# Patient Record
Sex: FEMALE | Race: WHITE | URBAN - METROPOLITAN AREA
[De-identification: names, ages, dates, MRNs, and addresses within clinical notes are randomized per-mention and may not be internally consistent; named-entity substitution may affect disease eponyms.]

---

## 2017-03-17 ENCOUNTER — OUTPATIENT (OUTPATIENT)
Dept: OUTPATIENT SERVICES | Facility: HOSPITAL | Age: 72
LOS: 1 days | End: 2017-03-17

## 2024-05-02 ENCOUNTER — EMERGENCY (EMERGENCY)
Facility: HOSPITAL | Age: 79
LOS: 1 days | Discharge: HOME | End: 2024-05-02
Attending: EMERGENCY MEDICINE
Payer: MEDICARE

## 2024-05-02 VITALS
TEMPERATURE: 98 F | RESPIRATION RATE: 18 BRPM | HEART RATE: 72 BPM | SYSTOLIC BLOOD PRESSURE: 128 MMHG | OXYGEN SATURATION: 100 % | DIASTOLIC BLOOD PRESSURE: 78 MMHG

## 2024-05-02 PROCEDURE — 93010 ELECTROCARDIOGRAM REPORT: CPT

## 2024-05-02 PROCEDURE — 99284 EMERGENCY DEPT VISIT MOD MDM: CPT

## 2024-05-02 NOTE — ED ADULT TRIAGE NOTE - CHIEF COMPLAINT QUOTE
Pt BIBEMS from home after having a syncopal episode while playing dominos with friends. Pt never fell out of chair and was caught by friends, pt endorses having AFib but denies palpitations or SOB. Pt denies this ever happening in the past. EKG to be obtained, Pt on Eliquis for AFib

## 2024-05-03 VITALS
HEART RATE: 78 BPM | SYSTOLIC BLOOD PRESSURE: 125 MMHG | DIASTOLIC BLOOD PRESSURE: 76 MMHG | RESPIRATION RATE: 16 BRPM | OXYGEN SATURATION: 98 % | TEMPERATURE: 98 F

## 2024-05-03 DIAGNOSIS — I48.91 UNSPECIFIED ATRIAL FIBRILLATION: ICD-10-CM

## 2024-05-03 DIAGNOSIS — R55 SYNCOPE AND COLLAPSE: ICD-10-CM

## 2024-05-03 LAB
ALBUMIN SERPL ELPH-MCNC: 4 G/DL — SIGNIFICANT CHANGE UP (ref 3.3–5.2)
ALP SERPL-CCNC: 149 U/L — HIGH (ref 40–120)
ALT FLD-CCNC: 14 U/L — SIGNIFICANT CHANGE UP
ANION GAP SERPL CALC-SCNC: 12 MMOL/L — SIGNIFICANT CHANGE UP (ref 5–17)
AST SERPL-CCNC: 25 U/L — SIGNIFICANT CHANGE UP
BASOPHILS # BLD AUTO: 0.09 K/UL — SIGNIFICANT CHANGE UP (ref 0–0.2)
BASOPHILS NFR BLD AUTO: 1.1 % — SIGNIFICANT CHANGE UP (ref 0–2)
BILIRUB SERPL-MCNC: <0.2 MG/DL — LOW (ref 0.4–2)
BUN SERPL-MCNC: 20.2 MG/DL — HIGH (ref 8–20)
CALCIUM SERPL-MCNC: 9.1 MG/DL — SIGNIFICANT CHANGE UP (ref 8.4–10.5)
CHLORIDE SERPL-SCNC: 101 MMOL/L — SIGNIFICANT CHANGE UP (ref 96–108)
CO2 SERPL-SCNC: 26 MMOL/L — SIGNIFICANT CHANGE UP (ref 22–29)
CREAT SERPL-MCNC: 0.65 MG/DL — SIGNIFICANT CHANGE UP (ref 0.5–1.3)
EGFR: 91 ML/MIN/1.73M2 — SIGNIFICANT CHANGE UP
EOSINOPHIL # BLD AUTO: 0.1 K/UL — SIGNIFICANT CHANGE UP (ref 0–0.5)
EOSINOPHIL NFR BLD AUTO: 1.2 % — SIGNIFICANT CHANGE UP (ref 0–6)
GLUCOSE SERPL-MCNC: 102 MG/DL — HIGH (ref 70–99)
HCT VFR BLD CALC: 40.2 % — SIGNIFICANT CHANGE UP (ref 34.5–45)
HGB BLD-MCNC: 13 G/DL — SIGNIFICANT CHANGE UP (ref 11.5–15.5)
IMM GRANULOCYTES NFR BLD AUTO: 0.6 % — SIGNIFICANT CHANGE UP (ref 0–0.9)
LYMPHOCYTES # BLD AUTO: 1.49 K/UL — SIGNIFICANT CHANGE UP (ref 1–3.3)
LYMPHOCYTES # BLD AUTO: 17.4 % — SIGNIFICANT CHANGE UP (ref 13–44)
MAGNESIUM SERPL-MCNC: 2 MG/DL — SIGNIFICANT CHANGE UP (ref 1.6–2.6)
MCHC RBC-ENTMCNC: 28.3 PG — SIGNIFICANT CHANGE UP (ref 27–34)
MCHC RBC-ENTMCNC: 32.3 GM/DL — SIGNIFICANT CHANGE UP (ref 32–36)
MCV RBC AUTO: 87.4 FL — SIGNIFICANT CHANGE UP (ref 80–100)
MONOCYTES # BLD AUTO: 0.57 K/UL — SIGNIFICANT CHANGE UP (ref 0–0.9)
MONOCYTES NFR BLD AUTO: 6.7 % — SIGNIFICANT CHANGE UP (ref 2–14)
NEUTROPHILS # BLD AUTO: 6.27 K/UL — SIGNIFICANT CHANGE UP (ref 1.8–7.4)
NEUTROPHILS NFR BLD AUTO: 73 % — SIGNIFICANT CHANGE UP (ref 43–77)
PLATELET # BLD AUTO: 242 K/UL — SIGNIFICANT CHANGE UP (ref 150–400)
POTASSIUM SERPL-MCNC: 3.8 MMOL/L — SIGNIFICANT CHANGE UP (ref 3.5–5.3)
POTASSIUM SERPL-SCNC: 3.8 MMOL/L — SIGNIFICANT CHANGE UP (ref 3.5–5.3)
PROT SERPL-MCNC: 7 G/DL — SIGNIFICANT CHANGE UP (ref 6.6–8.7)
RBC # BLD: 4.6 M/UL — SIGNIFICANT CHANGE UP (ref 3.8–5.2)
RBC # FLD: 13.4 % — SIGNIFICANT CHANGE UP (ref 10.3–14.5)
SODIUM SERPL-SCNC: 139 MMOL/L — SIGNIFICANT CHANGE UP (ref 135–145)
TROPONIN T, HIGH SENSITIVITY RESULT: 10 NG/L — SIGNIFICANT CHANGE UP (ref 0–51)
TROPONIN T, HIGH SENSITIVITY RESULT: 10 NG/L — SIGNIFICANT CHANGE UP (ref 0–51)
WBC # BLD: 8.57 K/UL — SIGNIFICANT CHANGE UP (ref 3.8–10.5)
WBC # FLD AUTO: 8.57 K/UL — SIGNIFICANT CHANGE UP (ref 3.8–10.5)

## 2024-05-03 PROCEDURE — 85025 COMPLETE CBC W/AUTO DIFF WBC: CPT

## 2024-05-03 PROCEDURE — 93005 ELECTROCARDIOGRAM TRACING: CPT

## 2024-05-03 PROCEDURE — 93017 CV STRESS TEST TRACING ONLY: CPT

## 2024-05-03 PROCEDURE — 80053 COMPREHEN METABOLIC PANEL: CPT

## 2024-05-03 PROCEDURE — A9500: CPT

## 2024-05-03 PROCEDURE — 83735 ASSAY OF MAGNESIUM: CPT

## 2024-05-03 PROCEDURE — 78452 HT MUSCLE IMAGE SPECT MULT: CPT | Mod: 26,MC

## 2024-05-03 PROCEDURE — 78452 HT MUSCLE IMAGE SPECT MULT: CPT | Mod: MC

## 2024-05-03 PROCEDURE — 71045 X-RAY EXAM CHEST 1 VIEW: CPT

## 2024-05-03 PROCEDURE — 36415 COLL VENOUS BLD VENIPUNCTURE: CPT

## 2024-05-03 PROCEDURE — 71045 X-RAY EXAM CHEST 1 VIEW: CPT | Mod: 26

## 2024-05-03 PROCEDURE — 93306 TTE W/DOPPLER COMPLETE: CPT | Mod: 26

## 2024-05-03 PROCEDURE — 93016 CV STRESS TEST SUPVJ ONLY: CPT | Mod: MC

## 2024-05-03 PROCEDURE — 93018 CV STRESS TEST I&R ONLY: CPT | Mod: MC

## 2024-05-03 PROCEDURE — 99285 EMERGENCY DEPT VISIT HI MDM: CPT | Mod: 25

## 2024-05-03 PROCEDURE — 99284 EMERGENCY DEPT VISIT MOD MDM: CPT

## 2024-05-03 PROCEDURE — 84484 ASSAY OF TROPONIN QUANT: CPT

## 2024-05-03 PROCEDURE — 93306 TTE W/DOPPLER COMPLETE: CPT

## 2024-05-03 NOTE — ED PROVIDER NOTE - PROGRESS NOTE DETAILS
Haydee: PT w/ normal stress and echo. Blood work wnl. Cleared to f/u w/ cardiology as outpatient. Amenable to discharge.

## 2024-05-03 NOTE — ED PROVIDER NOTE - CLINICAL SUMMARY MEDICAL DECISION MAKING FREE TEXT BOX
78-year-old female history A-fib on Eliquis presents following a witnessed syncopal episode with preceding lightheadedness.  EKG unremarkable, no ectopy.  Will evaluate for acute underlying etiology of her syncopal episode with blood work, CXR.  Low suspicion for PE as etiology of syncope given patient is already on AC without signs or symptoms of DVT    EK:39 70bpm nsr nml axis nml intervals no acute ischemia or ectopy

## 2024-05-03 NOTE — ED ADULT NURSE REASSESSMENT NOTE - NS ED NURSE REASSESS COMMENT FT1
Patient is A&Ox 4 observed resting in bed with  at bedside. Awaiting ECCHO. Will continue to monitor. Bed is at lowest position and side rails up.

## 2024-05-03 NOTE — CONSULT NOTE ADULT - PROBLEM SELECTOR RECOMMENDATION 9
.  - syncopal episode, prodromal dizziness, chest tightness  - tele- no events, SR  - Troponin neg x 2  - Plan for NST, TTE today  - awaiting records from primary cardiologist, last TTE/2019, last nst 2018  - pt states back to baseline.  - [facility] as out patient.

## 2024-05-03 NOTE — ED ADULT NURSE NOTE - NSFALLUNIVINTERV_ED_ALL_ED
Bed/Stretcher in lowest position, wheels locked, appropriate side rails in place/Call bell, personal items and telephone in reach/Instruct patient to call for assistance before getting out of bed/chair/stretcher/Non-slip footwear applied when patient is off stretcher/[place] to call system/Physically safe environment - no spills, clutter or unnecessary equipment/Purposeful proactive rounding/Room/bathroom lighting operational, light cord in reach

## 2024-05-03 NOTE — CONSULT NOTE ADULT - NS ATTEND AMEND GEN_ALL_CORE FT
Patient seen and examined and agree with above plan; patient presented s/p syncope possibly vasovagal   EKG reviewed shows normal sinus rhythm normal HS trop   will refer for NST and TTE to assess for ischemia if normal can be discharged home     Christi Pool D.O. [facility]  Cardiology/Vascular Cardiology -[facility]   Telephone # [phone]

## 2024-05-03 NOTE — ED ADULT NURSE NOTE - OBJECTIVE STATEMENT
pt A & Ox4 for witnessed syncopal episode. Respirations even and unlabored. Denies chest pain or SOB. Pt reports while at a party, friends witnessed syncopal episode w/ approx. 3 minutes of down time. Denies head strike. Pt denies symptoms prior to event or during day. Denies chest pain during or after event. IV established. Blood specimens sent to lab. Medications administered as ordered. CM and  ongoing.

## 2024-05-03 NOTE — ED PROVIDER NOTE - OBJECTIVE STATEMENT
78-year-old female history A-fib on Eliquis presents following a witnessed syncopal episode at a gathering [place].  Pt states she was playing dominoes when she began feeling lightheaded, and she felt like her clothes were on too tight.  She subsequently syncopized, witnessed by her friends at the gathering.  Per one of the attendants at bedside, Baljinder, patient lowered her face onto the table, and they were unable to arouse her for about 3 minutes before she spontaneously woke back up.  She appeared tired but anxious after waking up, but returned to baseline after several minutes.  Patient denies any preceding CP, SOB, recent fevers, chills, cough, sore throat, runny nose, leg pain/swelling.  She states she otherwise feels well right now, back to baseline

## 2024-05-03 NOTE — ED PROVIDER NOTE - PATIENT PORTAL LINK FT
You can access the FollowMyHealth Patient Portal offered by [facility] by registering at the following website: [email or web address]. By joining [facility]’s FollowMyHealth portal, you will also be able to view your health information using other applications (apps) compatible with our system.

## 2024-05-03 NOTE — ED ADULT NURSE REASSESSMENT NOTE - NS ED NURSE REASSESS COMMENT FT1
Assumed care of patient at 0730. Patient A&Ox4, observed resting on the side of the bed with  at bedside. No current complaints or issues. Denies dizziness, chest pain, SOB. Awaiting ECHO. Will continue to monitor. Bed is at the lowest position.

## 2024-05-03 NOTE — CONSULT NOTE ADULT - SUBJECTIVE AND OBJECTIVE BOX
[facility]                                              CARDIOLOGY AT [address], [place] [address]                                             Telephone: [phone]. Fax:[phone]                                                       CARDIOLOGY CONSULTATION NOTE                                                                                             History obtained by: Patient and medical record  Community Cardiologist: mecca have heart   obtained: Yes [  ] No [  ]  Reason for Consultation: syncope  Available out pt records reviewed: Yes [  ] No [  ]    Chief complaint:    Patient is a 78y old  Female who presents with a chief complaint of     HPI: 78 F with PMH Afib on Eliquis, macular degeneration presents to ED with c/o syncopal episode. Pt states playing dominos, started to feel dizzy, and "band around chest" then as per  pt put her head on the table. passed out for approximatlely three minutes. Pt denies recent illness, headache, chest pain, palpitations, shortness of breath.       CARDIAC TESTING   ECHO:    STRESS:    CATH:     ELECTROPHYSIOLOGY:     PAST MEDICAL HISTORY  Afib    PAST SURGICAL HISTORY      SOCIAL HISTORY:  Denies smoking/alcohol/drug          FAMILY HISTORY:        HOME MEDICATIONS:  Eliquis        CURRENT CARDIAC MEDICATIONS:      CURRENT OTHER MEDICATIONS:      ALLERGIES:   Allergy Status Unknown        REVIEW OF SYMPTOMS:   CONSTITUTIONAL: No fever, no chills, no weight loss, no weight gain, no fatigue   ENMT:  No vertigo; No sinus or throat pain  NECK: No pain or stiffness  CARDIOVASCULAR: No chest pain, no dyspnea, + syncope/presyncope, no palpitations, no dizziness, no Orthopnea, no Paroxsymal nocturnal dyspnea  RESPIRATORY: no Shortness of breath, no cough, no wheezing  : No dysuria, no hematuria   GI: No dark color stool, no nausea, no diarrhea, no constipation, no abdominal pain   NEURO: No headache, no slurred speech   MUSCULOSKELETAL: No joint pain or swelling; No muscle, back, or extremity pain  PSYCH: No agitation, no anxiety.    ALL OTHER REVIEW OF SYSTEMS ARE NEGATIVE.      VITAL SIGNS:  T(C): 36.6 (05-03-24 @ 08:11), Max: 36.8 (05-02-24 @ 22:34)  T(F): 97.9 (05-03-24 @ 08:11), Max: 98.2 (05-02-24 @ 22:34)  HR: 72 (05-03-24 @ 08:11) (71 - 73)  BP: 140/59 (05-03-24 @ 08:11) (123/60 - 140/59)  RR: 17 (05-03-24 @ 08:11) (17 - 18)  SpO2: 99% (05-03-24 @ 08:11) (99% - 100%)           PHYSICAL EXAM:  Constitutional: Comfortable . No acute distress.   HEENT: Atraumatic and normocephalic , neck is supple . no JVD. No carotid bruit.  CNS: A&Ox3. No focal deficits.   Respiratory: CTAB, unlabored   Cardiovascular: RRR normal s1 s2. No murmur. No rubs or gallop.  Gastrointestinal: Soft, non-tender. +Bowel sounds.   Extremities: 2+ Peripheral Pulses, No edema  Psychiatric: Calm . no agitation.   Skin: Warm and dry, no ulcers on extremities         LABS:                            13.0   8.57  )-----------( 242      ( 03 May 2024 01:18 )             40.2     05-03    139  |  101  |  20.2<H>  ----------------------------<  102<H>  3.8   |  26.0  |  0.65    Ca    9.1      03 May 2024 01:18  Mg     2.0     05-03    TPro  7.0  /  Alb  4.0  /  TBili  <0.2<L>  /  DBili  x   /  AST  25  /  ALT  14  /  AlkPhos  149<H>  05-03      Urinalysis Basic - ( 03 May 2024 01:18 )    Color: x / Appearance: x / SG: x / pH: x  Gluc: 102 mg/dL / Ketone: x  / Bili: x / Urobili: x   Blood: x / Protein: x / Nitrite: x   Leuk Esterase: x / RBC: x / WBC x   Sq Epi: x / Non Sq Epi: x / Bacteria: x        INTERPRETATION OF TELEMETRY: SR, no arrythmias, no alarms     ECG: SR

## 2024-05-03 NOTE — CONSULT NOTE ADULT - ASSESSMENT
78 F with PMH Afib on Eliquis, macular degeneration presents to ED with c/o syncopal episode. Pt states playing dominos, started to feel dizzy, and "band around chest" then as per  pt put her head on the table. passed out for approximatlely three minutes. Pt denies recent illness, headache, chest pain, palpitations, shortness of breath.

## 2024-05-03 NOTE — ED PROVIDER NOTE - ATTENDING CONTRIBUTION TO CARE
78-year-old female with a past medical history of A-fib on Eliquis presents after syncope.  Patient states she was playing dominoes when she started to feel lightheaded and passed out for few seconds witnessed by her friends. Pt denies fevers/chills, ha, focal neuro deficits, cp/sob/palp, cough, abd pain/n/v/d, urinary symptoms, recent travel and sick contacts.  A&Ox3, moving all extremities symmetrically, follows commands, motor shani upper and lower ext 5/5, sensory symm and intact CN 2-12 grossly intact, no ataxia, no nystagmus, no dysmetria, no ddk, symm shani, no pronator drift  no acute ischemic changes on ekg, syncope work up